# Patient Record
Sex: FEMALE | Race: WHITE
[De-identification: names, ages, dates, MRNs, and addresses within clinical notes are randomized per-mention and may not be internally consistent; named-entity substitution may affect disease eponyms.]

---

## 2019-08-09 ENCOUNTER — HOSPITAL ENCOUNTER (INPATIENT)
Dept: HOSPITAL 54 - ER | Age: 64
LOS: 4 days | Discharge: HOME | DRG: 871 | End: 2019-08-13
Attending: FAMILY MEDICINE | Admitting: INTERNAL MEDICINE
Payer: MEDICARE

## 2019-08-09 VITALS — SYSTOLIC BLOOD PRESSURE: 101 MMHG | DIASTOLIC BLOOD PRESSURE: 49 MMHG

## 2019-08-09 VITALS — DIASTOLIC BLOOD PRESSURE: 39 MMHG | SYSTOLIC BLOOD PRESSURE: 130 MMHG

## 2019-08-09 VITALS — DIASTOLIC BLOOD PRESSURE: 51 MMHG | SYSTOLIC BLOOD PRESSURE: 91 MMHG

## 2019-08-09 VITALS — BODY MASS INDEX: 25.71 KG/M2 | HEIGHT: 66 IN | WEIGHT: 160 LBS

## 2019-08-09 VITALS — DIASTOLIC BLOOD PRESSURE: 48 MMHG | SYSTOLIC BLOOD PRESSURE: 94 MMHG

## 2019-08-09 DIAGNOSIS — Z87.81: ICD-10-CM

## 2019-08-09 DIAGNOSIS — N17.0: ICD-10-CM

## 2019-08-09 DIAGNOSIS — Z79.899: ICD-10-CM

## 2019-08-09 DIAGNOSIS — J18.9: ICD-10-CM

## 2019-08-09 DIAGNOSIS — I21.4: ICD-10-CM

## 2019-08-09 DIAGNOSIS — Z87.01: ICD-10-CM

## 2019-08-09 DIAGNOSIS — J96.01: ICD-10-CM

## 2019-08-09 DIAGNOSIS — A41.9: Primary | ICD-10-CM

## 2019-08-09 DIAGNOSIS — M19.90: ICD-10-CM

## 2019-08-09 DIAGNOSIS — D63.8: ICD-10-CM

## 2019-08-09 DIAGNOSIS — I21.A1: ICD-10-CM

## 2019-08-09 DIAGNOSIS — I50.32: ICD-10-CM

## 2019-08-09 DIAGNOSIS — G93.41: ICD-10-CM

## 2019-08-09 DIAGNOSIS — N18.9: ICD-10-CM

## 2019-08-09 DIAGNOSIS — I12.9: ICD-10-CM

## 2019-08-09 LAB
ALBUMIN SERPL BCP-MCNC: 1.7 G/DL (ref 3.4–5)
ALP SERPL-CCNC: 123 U/L (ref 46–116)
ALT SERPL W P-5'-P-CCNC: 27 U/L (ref 12–78)
AST SERPL W P-5'-P-CCNC: 52 U/L (ref 15–37)
BASE EXCESS BLDA CALC-SCNC: 1.1 MMOL/L
BASOPHILS # BLD AUTO: 0.1 /CMM (ref 0–0.2)
BASOPHILS NFR BLD AUTO: 0.7 % (ref 0–2)
BILIRUB DIRECT SERPL-MCNC: 0.1 MG/DL (ref 0–0.2)
BILIRUB SERPL-MCNC: 0.1 MG/DL (ref 0.2–1)
BUN SERPL-MCNC: 33 MG/DL (ref 7–18)
CALCIUM SERPL-MCNC: 8.8 MG/DL (ref 8.5–10.1)
CHLORIDE SERPL-SCNC: 103 MMOL/L (ref 98–107)
CO2 SERPL-SCNC: 25 MMOL/L (ref 21–32)
CREAT SERPL-MCNC: 1.3 MG/DL (ref 0.6–1.3)
DO-HGB MFR BLDA: 401.8 MMHG
EOSINOPHIL NFR BLD AUTO: 0.1 % (ref 0–6)
GLUCOSE SERPL-MCNC: 119 MG/DL (ref 74–106)
HCT VFR BLD AUTO: 27 % (ref 33–45)
HGB BLD-MCNC: 9 G/DL (ref 11.5–14.8)
INHALED O2 CONCENTRATION: 100 %
INHALED O2 FLOW RATE: 15 L/MIN (ref 0–30)
LYMPHOCYTES NFR BLD AUTO: 1.4 /CMM (ref 0.8–4.8)
LYMPHOCYTES NFR BLD AUTO: 13.7 % (ref 20–44)
MCHC RBC AUTO-ENTMCNC: 33 G/DL (ref 31–36)
MCV RBC AUTO: 90 FL (ref 82–100)
MONOCYTES NFR BLD AUTO: 1.1 /CMM (ref 0.1–1.3)
MONOCYTES NFR BLD AUTO: 10.7 % (ref 2–12)
NEUTROPHILS # BLD AUTO: 7.8 /CMM (ref 1.8–8.9)
NEUTROPHILS NFR BLD AUTO: 74.8 % (ref 43–81)
PCO2 TEMP ADJ BLDA: 48.2 MMHG (ref 35–45)
PH TEMP ADJ BLDA: 7.36 [PH] (ref 7.35–7.45)
PH UR STRIP: 5 [PH] (ref 5–8)
PLATELET # BLD AUTO: 363 /CMM (ref 150–450)
PO2 TEMP ADJ BLDA: 263 MMHG (ref 75–100)
POTASSIUM SERPL-SCNC: 4.6 MMOL/L (ref 3.5–5.1)
PROT SERPL-MCNC: 6.9 G/DL (ref 6.4–8.2)
RBC # BLD AUTO: 3.02 MIL/UL (ref 4–5.2)
RBC #/AREA URNS HPF: (no result) /HPF (ref 0–2)
SAO2 % BLDA: 99 % (ref 92–98.5)
SODIUM SERPL-SCNC: 138 MMOL/L (ref 136–145)
UROBILINOGEN UR STRIP-MCNC: 0.2 EU/DL
WBC #/AREA URNS HPF: (no result) /HPF (ref 0–3)
WBC NRBC COR # BLD AUTO: 10.5 K/UL (ref 4.3–11)

## 2019-08-09 PROCEDURE — G0378 HOSPITAL OBSERVATION PER HR: HCPCS

## 2019-08-09 PROCEDURE — C9113 INJ PANTOPRAZOLE SODIUM, VIA: HCPCS

## 2019-08-09 RX ADMIN — SODIUM CHLORIDE PRN MLS/HR: 9 INJECTION, SOLUTION INTRAVENOUS at 20:57

## 2019-08-09 RX ADMIN — LEVOFLOXACIN SCH MG: 500 TABLET, FILM COATED ORAL at 21:21

## 2019-08-09 RX ADMIN — PIPERACILLIN SODIUM AND TAZOBACTAM SODIUM SCH MLS/HR: .375; 3 INJECTION, POWDER, LYOPHILIZED, FOR SOLUTION INTRAVENOUS at 23:00

## 2019-08-09 RX ADMIN — ENOXAPARIN SODIUM SCH MG: 40 INJECTION SUBCUTANEOUS at 20:57

## 2019-08-09 NOTE — NUR
ICU RN

RCD PT FROM ER DX PNA; A/Ox 4. NSR ON MONITOR.

O2 2L NC CLEAR BREATH SOUNDS. SKIN INTACT.

PT EDUCATED ON PLAN OF CARE. VERBALIZES UNDERSTANDING.

## 2019-08-09 NOTE — NUR
"SOB WITH HYPOXIA PER PARAMEDIC REPORT, RECENTLY DC FROM [facility]" PT TO BED 
5, PT AAOX4, ON 15L NRBR, O2 %, RT AND MD AT BEDSIDE

## 2019-08-10 VITALS — SYSTOLIC BLOOD PRESSURE: 157 MMHG | DIASTOLIC BLOOD PRESSURE: 80 MMHG

## 2019-08-10 VITALS — SYSTOLIC BLOOD PRESSURE: 145 MMHG | DIASTOLIC BLOOD PRESSURE: 76 MMHG

## 2019-08-10 VITALS — SYSTOLIC BLOOD PRESSURE: 147 MMHG | DIASTOLIC BLOOD PRESSURE: 67 MMHG

## 2019-08-10 VITALS — DIASTOLIC BLOOD PRESSURE: 62 MMHG | SYSTOLIC BLOOD PRESSURE: 118 MMHG

## 2019-08-10 VITALS — DIASTOLIC BLOOD PRESSURE: 82 MMHG | SYSTOLIC BLOOD PRESSURE: 153 MMHG

## 2019-08-10 VITALS — SYSTOLIC BLOOD PRESSURE: 148 MMHG | DIASTOLIC BLOOD PRESSURE: 76 MMHG

## 2019-08-10 VITALS — SYSTOLIC BLOOD PRESSURE: 149 MMHG | DIASTOLIC BLOOD PRESSURE: 77 MMHG

## 2019-08-10 VITALS — DIASTOLIC BLOOD PRESSURE: 63 MMHG | SYSTOLIC BLOOD PRESSURE: 117 MMHG

## 2019-08-10 VITALS — DIASTOLIC BLOOD PRESSURE: 77 MMHG | SYSTOLIC BLOOD PRESSURE: 128 MMHG

## 2019-08-10 VITALS — SYSTOLIC BLOOD PRESSURE: 149 MMHG | DIASTOLIC BLOOD PRESSURE: 79 MMHG

## 2019-08-10 VITALS — DIASTOLIC BLOOD PRESSURE: 60 MMHG | SYSTOLIC BLOOD PRESSURE: 112 MMHG

## 2019-08-10 VITALS — DIASTOLIC BLOOD PRESSURE: 70 MMHG | SYSTOLIC BLOOD PRESSURE: 132 MMHG

## 2019-08-10 VITALS — SYSTOLIC BLOOD PRESSURE: 144 MMHG | DIASTOLIC BLOOD PRESSURE: 71 MMHG

## 2019-08-10 VITALS — DIASTOLIC BLOOD PRESSURE: 73 MMHG | SYSTOLIC BLOOD PRESSURE: 120 MMHG

## 2019-08-10 VITALS — DIASTOLIC BLOOD PRESSURE: 66 MMHG | SYSTOLIC BLOOD PRESSURE: 160 MMHG

## 2019-08-10 VITALS — SYSTOLIC BLOOD PRESSURE: 156 MMHG | DIASTOLIC BLOOD PRESSURE: 88 MMHG

## 2019-08-10 VITALS — DIASTOLIC BLOOD PRESSURE: 68 MMHG | SYSTOLIC BLOOD PRESSURE: 148 MMHG

## 2019-08-10 LAB
ALBUMIN SERPL BCP-MCNC: 1.5 G/DL (ref 3.4–5)
ALP SERPL-CCNC: 102 U/L (ref 46–116)
ALT SERPL W P-5'-P-CCNC: 23 U/L (ref 12–78)
AST SERPL W P-5'-P-CCNC: 42 U/L (ref 15–37)
BASOPHILS # BLD AUTO: 0 /CMM (ref 0–0.2)
BASOPHILS NFR BLD AUTO: 0.3 % (ref 0–2)
BILIRUB SERPL-MCNC: 0.3 MG/DL (ref 0.2–1)
BUN SERPL-MCNC: 25 MG/DL (ref 7–18)
CALCIUM SERPL-MCNC: 8 MG/DL (ref 8.5–10.1)
CHLORIDE SERPL-SCNC: 106 MMOL/L (ref 98–107)
CHOLEST SERPL-MCNC: 78 MG/DL (ref ?–200)
CO2 SERPL-SCNC: 24 MMOL/L (ref 21–32)
CREAT SERPL-MCNC: 1.1 MG/DL (ref 0.6–1.3)
EOSINOPHIL NFR BLD AUTO: 1.2 % (ref 0–6)
GLUCOSE SERPL-MCNC: 92 MG/DL (ref 74–106)
HCT VFR BLD AUTO: 26 % (ref 33–45)
HDLC SERPL-MCNC: 20 MG/DL (ref 40–60)
HEMOCCULT STL QL: NEGATIVE
HGB BLD-MCNC: 8.5 G/DL (ref 11.5–14.8)
IRON SERPL-MCNC: 14 UG/DL (ref 50–175)
LDLC SERPL DIRECT ASSAY-MCNC: 36 MG/DL (ref 0–99)
LYMPHOCYTES NFR BLD AUTO: 0.7 /CMM (ref 0.8–4.8)
LYMPHOCYTES NFR BLD AUTO: 7.8 % (ref 20–44)
MAGNESIUM SERPL-MCNC: 1.6 MG/DL (ref 1.8–2.4)
MCHC RBC AUTO-ENTMCNC: 33 G/DL (ref 31–36)
MCV RBC AUTO: 91 FL (ref 82–100)
MONOCYTES NFR BLD AUTO: 0.9 /CMM (ref 0.1–1.3)
MONOCYTES NFR BLD AUTO: 11 % (ref 2–12)
NEUTROPHILS # BLD AUTO: 6.8 /CMM (ref 1.8–8.9)
NEUTROPHILS NFR BLD AUTO: 79.7 % (ref 43–81)
PHOSPHATE SERPL-MCNC: 3.7 MG/DL (ref 2.5–4.9)
PLATELET # BLD AUTO: 309 /CMM (ref 150–450)
POTASSIUM SERPL-SCNC: 4 MMOL/L (ref 3.5–5.1)
PROT SERPL-MCNC: 6.3 G/DL (ref 6.4–8.2)
RBC # BLD AUTO: 2.81 MIL/UL (ref 4–5.2)
SODIUM SERPL-SCNC: 140 MMOL/L (ref 136–145)
TIBC SERPL-MCNC: 115 UG/DL (ref 250–450)
TRIGL SERPL-MCNC: 146 MG/DL (ref 30–150)
WBC NRBC COR # BLD AUTO: 8.6 K/UL (ref 4.3–11)

## 2019-08-10 RX ADMIN — Medication PRN EACH: at 12:22

## 2019-08-10 RX ADMIN — ENOXAPARIN SODIUM SCH MG: 40 INJECTION SUBCUTANEOUS at 21:00

## 2019-08-10 RX ADMIN — PIPERACILLIN SODIUM AND TAZOBACTAM SODIUM SCH MLS/HR: .375; 3 INJECTION, POWDER, LYOPHILIZED, FOR SOLUTION INTRAVENOUS at 05:00

## 2019-08-10 RX ADMIN — ALPRAZOLAM PRN MG: 0.25 TABLET ORAL at 22:18

## 2019-08-10 RX ADMIN — PIPERACILLIN SODIUM AND TAZOBACTAM SODIUM SCH MLS/HR: .375; 3 INJECTION, POWDER, LYOPHILIZED, FOR SOLUTION INTRAVENOUS at 14:06

## 2019-08-10 RX ADMIN — Medication PRN EACH: at 16:37

## 2019-08-10 RX ADMIN — MAGNESIUM SULFATE IN DEXTROSE SCH MLS/HR: 10 INJECTION, SOLUTION INTRAVENOUS at 09:39

## 2019-08-10 RX ADMIN — Medication SCH EACH: at 09:40

## 2019-08-10 RX ADMIN — MAGNESIUM SULFATE IN DEXTROSE SCH MLS/HR: 10 INJECTION, SOLUTION INTRAVENOUS at 11:32

## 2019-08-10 RX ADMIN — SODIUM CHLORIDE PRN MLS/HR: 9 INJECTION, SOLUTION INTRAVENOUS at 12:24

## 2019-08-10 RX ADMIN — CEFEPIME HYDROCHLORIDE SCH MLS/HR: 1 INJECTION, POWDER, FOR SOLUTION INTRAMUSCULAR; INTRAVENOUS at 17:56

## 2019-08-10 RX ADMIN — Medication PRN EACH: at 20:58

## 2019-08-10 RX ADMIN — ASPIRIN 81 MG SCH MG: 81 TABLET ORAL at 08:46

## 2019-08-10 RX ADMIN — Medication SCH EACH: at 16:07

## 2019-08-10 RX ADMIN — LEVOFLOXACIN SCH MG: 500 TABLET, FILM COATED ORAL at 20:57

## 2019-08-10 RX ADMIN — ASPIRIN 81 MG SCH MG: 81 TABLET ORAL at 08:48

## 2019-08-10 NOTE — NUR
RN NOTES



INFORMED DR JACKSON ON PROCALCITONIN AT 4.29. NO NEW ORDER AND WITH REQUEST TO INFORM 
PULMONOLOGIST

## 2019-08-10 NOTE — NUR
TELE/RN 



PATIENT TRANSFERRED FROM ICU IN STABLE CONDITION, ON TELE AT THIS TIME TOLERATING WELL. 
RECEIVED REPORT FROM DARRICK RN FOR ANY EDVIN. A/O X 4. NO SIGNS OF ACUTE DISTRESS. NO 
COMPLAIN OF PAIN OR DISCOMFORT. ON OXYGEN VIA NC AT 2LPM, TOLERATING WELL. ALL NEEDS 
ATTENDED TO AT THIS TIME. WILL CONTINUE TO MONITOR TO ENSURE SAFETY.

## 2019-08-10 NOTE — NUR
RN NOTES



TRANSFERRED PATIENT TO ROOM 309-1. HAND OFF. ENDORSED TO WILFRIDO THAT ZOSYN NOT HANGED YET 
TO THIS TIME SINCE PATIENT STILL GETTING VANCOMYCIN AND MAGNESIUM.

## 2019-08-10 NOTE — NUR
TELE RN NOTES  PAIN MANAGEMENT

C/O GENERALIZED PAIN 7/10 ON PAIN SCALE,NORCO 5/325MG,1 TAB PO GIVEN AS ORDERED.

## 2019-08-10 NOTE — NUR
ICU RN

PT UPSET SHE IS AT THIS HOSPITAL. STATES SHE SHOULD HAVE BEEN TAKEN TO [facility].

PER PT SHE IS FRUSTRATED AND HAS REMOVED NIBP CUFF.

PT REQUESTED SOMETHING FOR HEAD PAIN.

OFFERED TYLENOL; PT AGREED. MEDICATED WITH TYLENOL 650 MG PO FOR HEAD ACHE 3/10.

CONTINUE TO MONITOR.

## 2019-08-10 NOTE — NUR
RN NOTES



RECEIVED PATIENT IN BED, A/A/O X4, ON SITTING POSITION.  ABLE TO MAKE NEEDS KNOWN. DENIES 
SHORTNESS OF BREATH, ON OXYGEN SUPPORT VIA NASAL CANNULA AT 2LPM, SATING FINE BUT WITH 
OBVIOUS EFFORT ON BREATHING SPECIALLY WHEN TALKING. PATIENT " WHY AM I HERE? I WANTED TO GO 
HOME."REORIENTED PATIENT ON THE SITUATION AND INFORMED HER THAT HER STAY IN THE HOSPITAL 
WILL BE UP TO THE MD AND WOULD BE BETTER IF SHE DIRECTS HER CONCERN WITH THE MD. WILL 
INFORMED MD ABOUT IT.  PATIENT DENIES ANY PAIN AT THIS TIME. SINUS RHYTHM ON THE MONITOR 
WITH HR ON THE 90'S. IV LINE ON THE R AC G 20 AND R HAND G 20: BOTH IN PLACE, DRESSING CDI, 
PATENT ON FLUSHING, WITH ONGOING IVF OF NS AT 75 CC/HR. PATIENT ENCOURAGE TO CALL FOR 
HELP/ASSISTANCE. SAFETY MEASURES OBSERVED AND MAINTAINED. CALL LIGHT PLACED WITHIN REACH. 
WILL CONTINUE TO MONITOR AND ANTICIPATE NEEDS OF THE PATIENT

## 2019-08-10 NOTE — NUR
RN NOTES



PATIENT REFUSED SCHEDULED  ASPIRIN " I DONT TAKE ASPIRIN" INFORMED PATIENT THAT IT WAS JUST 
ORDERED BY DR JACKSON. " I REALLY DONT THING ASPIRIN SPECIALLY NOW THEY HAVE GIVEN ME BLOOD 
THINNER." RISK AND BENEFITS EXPLAINED. PATIENT STILL REFUSED

## 2019-08-10 NOTE — NUR
TELE/RN CLOSING NOTE



PATIENT IN BED IN STABLE CONDITION. A/O X 4. NO SIGNS OF ACUTE DISTRESS. NO COMPLAIN OF PAIN 
OR DISCOMFORT. ON TELE MONITOR NOTED WITH SR WITH RATE OF 89. ALL NEEDS ATTENDED TO. CALL 
LIGHT WITHIN REACH. WILL ENDORSE TO NEXT SHIFT FOR CONTINUITY OF CARE.

## 2019-08-10 NOTE — NUR
TELE RN NOTES

RECEIVED ON BED A/O X4,TRANSFER FROM ICU TODAY,BREATHING NON LABORED,DIMINISHED BREATH SOUND 
ON BOTH LOWER LUNG FIELD DUE TO PNEUMONIA.O2 IN USED AT 2L/TO KEEP O2 SAT ABOVE 90%,97% AT 
THE MOMENT.SALINE LOCK LEFT HAND INTACT AND PATENT.NS AT 75ML/HR RATE IN PROGRESS.CLAIMED OF 
HAVING DIARRHEA, HAD ONE THIS TIME AND ITS SOFT AND GREENISH,MODERATE AMOUNT.APPEARS 
WEAK,ASSISTED TO BEDSIDE COMMODE,TOLERATED WELL.ON GEL BED FOR SKIN MANAGEMENT.CALL LIGHT IN 
REACH,NEEDS ANTICIPATED.

## 2019-08-10 NOTE — NUR
ICU RN

PT DECLINED TO BE REPOSITIONED BY NURSING STAFF; PER PT OFFLOADING ON PILLOW CAUSED HER BACK 
TO HURT.

PT REMAINED IN SUPINE POSITION AND DECLINED OFFLOADING HEELS/ELBOWS.

## 2019-08-10 NOTE — NUR
TELE RN NOTES

VERY ANXIOUS.DR KEN MADE AWARE,WITH ORDER TO GIVE XANAX .25MG PO Q8 HOURS AS NEEDED 
NOTED AND CARRIED OUT.

## 2019-08-10 NOTE — NUR
TELE RN NOTES

OFFERED LOVENOX AS SCHEDULED BUT REFUSED.EXPLAINED RISK AND BENEFITS,BUT STILL REFUSED.

## 2019-08-10 NOTE — NUR
RN NOTES



SEEN AND EXAMINED BY DR. JACKSON WITH ORDERS FOR LABS IN AM AND PATIENT TO TRANSFER  TO MED 
SURG.

## 2019-08-11 VITALS — SYSTOLIC BLOOD PRESSURE: 160 MMHG | DIASTOLIC BLOOD PRESSURE: 90 MMHG

## 2019-08-11 VITALS — DIASTOLIC BLOOD PRESSURE: 79 MMHG | SYSTOLIC BLOOD PRESSURE: 135 MMHG

## 2019-08-11 VITALS — DIASTOLIC BLOOD PRESSURE: 71 MMHG | SYSTOLIC BLOOD PRESSURE: 157 MMHG

## 2019-08-11 VITALS — DIASTOLIC BLOOD PRESSURE: 88 MMHG | SYSTOLIC BLOOD PRESSURE: 163 MMHG

## 2019-08-11 VITALS — DIASTOLIC BLOOD PRESSURE: 78 MMHG | SYSTOLIC BLOOD PRESSURE: 134 MMHG

## 2019-08-11 VITALS — SYSTOLIC BLOOD PRESSURE: 154 MMHG | DIASTOLIC BLOOD PRESSURE: 80 MMHG

## 2019-08-11 LAB
ALBUMIN SERPL BCP-MCNC: 1.5 G/DL (ref 3.4–5)
ALP SERPL-CCNC: 108 U/L (ref 46–116)
ALT SERPL W P-5'-P-CCNC: 21 U/L (ref 12–78)
AST SERPL W P-5'-P-CCNC: 30 U/L (ref 15–37)
BASOPHILS # BLD AUTO: 0 /CMM (ref 0–0.2)
BASOPHILS NFR BLD AUTO: 0.2 % (ref 0–2)
BILIRUB SERPL-MCNC: 0.3 MG/DL (ref 0.2–1)
BUN SERPL-MCNC: 8 MG/DL (ref 7–18)
CALCIUM SERPL-MCNC: 8.5 MG/DL (ref 8.5–10.1)
CHLORIDE SERPL-SCNC: 106 MMOL/L (ref 98–107)
CO2 SERPL-SCNC: 24 MMOL/L (ref 21–32)
CREAT SERPL-MCNC: 0.7 MG/DL (ref 0.6–1.3)
EOSINOPHIL NFR BLD AUTO: 1.8 % (ref 0–6)
GLUCOSE SERPL-MCNC: 88 MG/DL (ref 74–106)
HCT VFR BLD AUTO: 26 % (ref 33–45)
HGB BLD-MCNC: 8.7 G/DL (ref 11.5–14.8)
LYMPHOCYTES NFR BLD AUTO: 0.8 /CMM (ref 0.8–4.8)
LYMPHOCYTES NFR BLD AUTO: 10.5 % (ref 20–44)
MAGNESIUM SERPL-MCNC: 1.5 MG/DL (ref 1.8–2.4)
MCHC RBC AUTO-ENTMCNC: 33 G/DL (ref 31–36)
MCV RBC AUTO: 89 FL (ref 82–100)
MONOCYTES NFR BLD AUTO: 0.9 /CMM (ref 0.1–1.3)
MONOCYTES NFR BLD AUTO: 12.3 % (ref 2–12)
NEUTROPHILS # BLD AUTO: 5.8 /CMM (ref 1.8–8.9)
NEUTROPHILS NFR BLD AUTO: 75.2 % (ref 43–81)
PHOSPHATE SERPL-MCNC: 2.6 MG/DL (ref 2.5–4.9)
PLATELET # BLD AUTO: 359 /CMM (ref 150–450)
POTASSIUM SERPL-SCNC: 3.8 MMOL/L (ref 3.5–5.1)
PROT SERPL-MCNC: 6.5 G/DL (ref 6.4–8.2)
RBC # BLD AUTO: 2.95 MIL/UL (ref 4–5.2)
SODIUM SERPL-SCNC: 140 MMOL/L (ref 136–145)
WBC NRBC COR # BLD AUTO: 7.7 K/UL (ref 4.3–11)

## 2019-08-11 PROCEDURE — 05HC33Z INSERTION OF INFUSION DEVICE INTO LEFT BASILIC VEIN, PERCUTANEOUS APPROACH: ICD-10-PCS | Performed by: NURSE PRACTITIONER

## 2019-08-11 RX ADMIN — LEVOFLOXACIN SCH MG: 500 TABLET, FILM COATED ORAL at 20:53

## 2019-08-11 RX ADMIN — GUAIFENESIN AND DEXTROMETHORPHAN PRN ML: 100; 10 SYRUP ORAL at 21:48

## 2019-08-11 RX ADMIN — Medication PRN EACH: at 01:39

## 2019-08-11 RX ADMIN — GABAPENTIN SCH MG: 300 CAPSULE ORAL at 20:53

## 2019-08-11 RX ADMIN — Medication PRN EACH: at 05:25

## 2019-08-11 RX ADMIN — ALPRAZOLAM PRN MG: 0.25 TABLET ORAL at 06:50

## 2019-08-11 RX ADMIN — ALBUTEROL SULFATE PRN MG: 2.5 SOLUTION RESPIRATORY (INHALATION) at 23:37

## 2019-08-11 RX ADMIN — ATORVASTATIN CALCIUM SCH MG: 10 TABLET, FILM COATED ORAL at 21:48

## 2019-08-11 RX ADMIN — SODIUM CHLORIDE SCH MLS/HR: 9 INJECTION, SOLUTION INTRAVENOUS at 15:58

## 2019-08-11 RX ADMIN — Medication PRN MG: at 23:38

## 2019-08-11 RX ADMIN — ZOLPIDEM TARTRATE PRN MG: 5 TABLET, FILM COATED ORAL at 21:53

## 2019-08-11 RX ADMIN — CEFEPIME HYDROCHLORIDE SCH MLS/HR: 1 INJECTION, POWDER, FOR SOLUTION INTRAMUSCULAR; INTRAVENOUS at 17:53

## 2019-08-11 RX ADMIN — ENOXAPARIN SODIUM SCH MG: 40 INJECTION SUBCUTANEOUS at 20:59

## 2019-08-11 RX ADMIN — ALPRAZOLAM PRN MG: 0.25 TABLET ORAL at 15:34

## 2019-08-11 RX ADMIN — CEFEPIME HYDROCHLORIDE SCH MLS/HR: 1 INJECTION, POWDER, FOR SOLUTION INTRAMUSCULAR; INTRAVENOUS at 05:33

## 2019-08-11 RX ADMIN — Medication PRN EACH: at 15:34

## 2019-08-11 RX ADMIN — GABAPENTIN SCH MG: 300 CAPSULE ORAL at 17:52

## 2019-08-11 RX ADMIN — Medication SCH EACH: at 17:00

## 2019-08-11 RX ADMIN — Medication SCH EACH: at 09:00

## 2019-08-11 RX ADMIN — Medication PRN EACH: at 23:57

## 2019-08-11 RX ADMIN — ASPIRIN 81 MG SCH MG: 81 TABLET ORAL at 09:00

## 2019-08-11 RX ADMIN — SODIUM CHLORIDE PRN MLS/HR: 9 INJECTION, SOLUTION INTRAVENOUS at 05:15

## 2019-08-11 RX ADMIN — Medication PRN EACH: at 09:37

## 2019-08-11 RX ADMIN — CARISOPRODOL SCH MG: 350 TABLET ORAL at 17:52

## 2019-08-11 NOTE — NUR
MS RN NOTES

NOTED SKIN WARM TO THE TOUCH,BODY TEMPERATURE .3 VIA AXILLA.C/O GENERALIZED PAIN 6/10 
ON PAIN SCALE.MEDICATED WITH NORCO 5/325MG,1TAB PO AS ORDERED,ALONG WITH TESSALON ROSIO 
100MG PO FOR COUGHAS ORDERED.

## 2019-08-11 NOTE — NUR
TELE RN NOTES

SLEPT WITH INTERVALS.VERY NEEDY.STOOL OB NEGATIVE FOR OCCULT BLOOD.WITH EPISODE OF NON 
PRODUCTIVE COUGH,OFFERED COUGH MEDICINE TO BE ORDERED BY HOSPITALIST ON CALL BUT REFUSED.NO 
SOB,AFEBRILE.IN NO ACUTE DISTRESS.WILL ENDORSE TO IGOR GRADY FOR EDVIN.

## 2019-08-11 NOTE — NUR
MS RN NOTES

RECEIVED ON BED SLEEPING,AROUSABLE TO VERBAL STIMULI.BREATHING NON LABORED,OFF O2 THIS 
TIME.WITH LEFT UPPER ARM MIDLINE FOR IV ABX.AMBULATE WITH ASSIST AND TO BEDSIDE COMMODE.WILL 
CONTINUE TO MONITOR STATUS.

## 2019-08-11 NOTE — NUR
RN CLOSING NOTES



PATIENT IN STABLE CONDITION. ALL NEEDS ATTENDED AND PROVIDED. ALL DUE MEDICATIONS GIVEN AS 
ORDERED. ASSISTED WITH ADLS. KEPT PATIENT SAFE AND COMFORTABLE. BED IN LOW/LOCKED POSITION, 
SIDERAILS UPX2, CALL LIGHT IN REACH. ENDORSED TO YSABEL LINO FOR EDVIN

## 2019-08-11 NOTE — NUR
RN NOTES



CALLED AND SPOKE WITH DR SAMUEL REGARDING RECONCILING HER HOME MEDS. PER MD, CONTINUE ALL 
HOME MEDS. VERIFIED READ BACK NOTED AND WILL CARRY OUT

## 2019-08-11 NOTE — NUR
RN NOTES



IV ACCESS NOT PATENT, LEAKING, DC IV ACCESS.

2 ATTEMPTS MADE FOR IV INSERTION. PATIENT REFUSED AFTER 2 ATTEMPTS, PATIENT SAID "I DONT 
WANT IT ANYMORE!".

DR SAMUEL MADE AWARE AND ORDERED TO PUT A MIDLINE.

## 2019-08-11 NOTE — NUR
TELE RN NOTES  PAIN MANAGEMENT

C/O GENERALIZED PAIN 6/10 ON PAIN SCALE.MEDICATED WITH NORCO 5/325MG,1 TAB PO AS ORDERED.

## 2019-08-11 NOTE — NUR
RN NOTES

OFF ON OXYGEN.

PATIENT TOLERATING ROOM AIR SATTING 96%.

SEEN BY PT. AGGRESSIVE AMBULATION TODAY.

## 2019-08-11 NOTE — NUR
MS RN NOTES

STILL HAVING EPISODE OF NON PRODUCTIVE COUGH.O2 SAT 94% ON 2L/NC,WITH HEART RATE  
MANUALLY FOR ONE MINUTE.,RR 24.DR KEN MADE AWARE WITH NEW ORDER FOR BREATHING TREATMENT 
Q 4 HOURS AS NEEDED FOR SOB/CONGESTION,TESSALON ROSIO 100MG Q 8 OURS FOR COUGH AND CHEST 
X-RAY IN THE MORNING NOTED AND CARRIED OUT.

## 2019-08-11 NOTE — NUR
RN OPENING NOTES



RECEIVED PATIENT IN BED RESTING. A/OX3, ABLE TO MAKE NEEDS KNOWN. NOT IN ANY FORM OF 
DISTRESS. NO SOB. DENIED PAIN OR DISCOMFORT AT THIS TIME. IV ACCESS INTACT AND PATENT. KEPT 
PATIENT SAFE AND COMFORTABLE. BED IN LOW/LOCKED POSITION, SIDERAILS UPX2, CALL LIGHT IN 
REACH.WILL CONTINUE TO MONITOR ACCORDINGLY.

## 2019-08-12 VITALS — DIASTOLIC BLOOD PRESSURE: 78 MMHG | SYSTOLIC BLOOD PRESSURE: 145 MMHG

## 2019-08-12 VITALS — SYSTOLIC BLOOD PRESSURE: 150 MMHG | DIASTOLIC BLOOD PRESSURE: 78 MMHG

## 2019-08-12 VITALS — DIASTOLIC BLOOD PRESSURE: 61 MMHG | SYSTOLIC BLOOD PRESSURE: 118 MMHG

## 2019-08-12 VITALS — SYSTOLIC BLOOD PRESSURE: 137 MMHG

## 2019-08-12 VITALS — DIASTOLIC BLOOD PRESSURE: 73 MMHG | SYSTOLIC BLOOD PRESSURE: 137 MMHG

## 2019-08-12 LAB
BASOPHILS # BLD AUTO: 0 /CMM (ref 0–0.2)
BASOPHILS NFR BLD AUTO: 0.5 % (ref 0–2)
BUN SERPL-MCNC: 5 MG/DL (ref 7–18)
CALCIUM SERPL-MCNC: 8.3 MG/DL (ref 8.5–10.1)
CHLORIDE SERPL-SCNC: 106 MMOL/L (ref 98–107)
CO2 SERPL-SCNC: 27 MMOL/L (ref 21–32)
CREAT SERPL-MCNC: 0.7 MG/DL (ref 0.6–1.3)
EOSINOPHIL NFR BLD AUTO: 0.3 % (ref 0–6)
GLUCOSE SERPL-MCNC: 98 MG/DL (ref 74–106)
HCT VFR BLD AUTO: 26 % (ref 33–45)
HEMOCCULT STL QL: NEGATIVE
HGB BLD-MCNC: 8.4 G/DL (ref 11.5–14.8)
LYMPHOCYTES NFR BLD AUTO: 1 /CMM (ref 0.8–4.8)
LYMPHOCYTES NFR BLD AUTO: 11.6 % (ref 20–44)
MAGNESIUM SERPL-MCNC: 1.4 MG/DL (ref 1.8–2.4)
MCHC RBC AUTO-ENTMCNC: 33 G/DL (ref 31–36)
MCV RBC AUTO: 90 FL (ref 82–100)
MONOCYTES NFR BLD AUTO: 0.9 /CMM (ref 0.1–1.3)
MONOCYTES NFR BLD AUTO: 10.6 % (ref 2–12)
NEUTROPHILS # BLD AUTO: 6.8 /CMM (ref 1.8–8.9)
NEUTROPHILS NFR BLD AUTO: 77 % (ref 43–81)
PLATELET # BLD AUTO: 367 /CMM (ref 150–450)
POTASSIUM SERPL-SCNC: 3.8 MMOL/L (ref 3.5–5.1)
RBC # BLD AUTO: 2.86 MIL/UL (ref 4–5.2)
SODIUM SERPL-SCNC: 140 MMOL/L (ref 136–145)
WBC NRBC COR # BLD AUTO: 8.8 K/UL (ref 4.3–11)

## 2019-08-12 RX ADMIN — CARISOPRODOL SCH MG: 350 TABLET ORAL at 16:22

## 2019-08-12 RX ADMIN — MAGNESIUM SULFATE IN DEXTROSE SCH MLS/HR: 10 INJECTION, SOLUTION INTRAVENOUS at 12:01

## 2019-08-12 RX ADMIN — MAGNESIUM SULFATE IN DEXTROSE SCH MLS/HR: 10 INJECTION, SOLUTION INTRAVENOUS at 10:56

## 2019-08-12 RX ADMIN — ZOLPIDEM TARTRATE PRN MG: 5 TABLET, FILM COATED ORAL at 22:16

## 2019-08-12 RX ADMIN — MAGNESIUM SULFATE IN DEXTROSE SCH MLS/HR: 10 INJECTION, SOLUTION INTRAVENOUS at 14:28

## 2019-08-12 RX ADMIN — GABAPENTIN SCH MG: 300 CAPSULE ORAL at 16:22

## 2019-08-12 RX ADMIN — SODIUM CHLORIDE SCH MLS/HR: 9 INJECTION, SOLUTION INTRAVENOUS at 15:41

## 2019-08-12 RX ADMIN — GABAPENTIN SCH MG: 300 CAPSULE ORAL at 09:33

## 2019-08-12 RX ADMIN — Medication SCH EACH: at 17:00

## 2019-08-12 RX ADMIN — Medication PRN MG: at 21:42

## 2019-08-12 RX ADMIN — MAGNESIUM SULFATE IN DEXTROSE SCH MLS/HR: 10 INJECTION, SOLUTION INTRAVENOUS at 13:17

## 2019-08-12 RX ADMIN — Medication SCH EACH: at 09:00

## 2019-08-12 RX ADMIN — CARISOPRODOL SCH MG: 350 TABLET ORAL at 09:33

## 2019-08-12 RX ADMIN — GABAPENTIN SCH MG: 300 CAPSULE ORAL at 12:39

## 2019-08-12 RX ADMIN — LEVOFLOXACIN SCH MG: 500 TABLET, FILM COATED ORAL at 21:13

## 2019-08-12 RX ADMIN — Medication PRN EACH: at 21:29

## 2019-08-12 RX ADMIN — ATORVASTATIN CALCIUM SCH MG: 10 TABLET, FILM COATED ORAL at 21:12

## 2019-08-12 RX ADMIN — GUAIFENESIN AND DEXTROMETHORPHAN PRN ML: 100; 10 SYRUP ORAL at 21:28

## 2019-08-12 RX ADMIN — Medication PRN EACH: at 15:54

## 2019-08-12 RX ADMIN — CARISOPRODOL SCH MG: 350 TABLET ORAL at 12:38

## 2019-08-12 RX ADMIN — ALBUTEROL SULFATE PRN MG: 2.5 SOLUTION RESPIRATORY (INHALATION) at 21:42

## 2019-08-12 RX ADMIN — ENOXAPARIN SODIUM SCH MG: 40 INJECTION SUBCUTANEOUS at 21:15

## 2019-08-12 RX ADMIN — GABAPENTIN SCH MG: 300 CAPSULE ORAL at 21:13

## 2019-08-12 RX ADMIN — CEFEPIME HYDROCHLORIDE SCH MLS/HR: 1 INJECTION, POWDER, FOR SOLUTION INTRAMUSCULAR; INTRAVENOUS at 05:29

## 2019-08-12 RX ADMIN — Medication PRN EACH: at 09:35

## 2019-08-12 RX ADMIN — CEFEPIME HYDROCHLORIDE SCH MLS/HR: 1 INJECTION, POWDER, FOR SOLUTION INTRAMUSCULAR; INTRAVENOUS at 17:25

## 2019-08-12 RX ADMIN — ASPIRIN 81 MG SCH MG: 81 TABLET ORAL at 09:00

## 2019-08-12 NOTE — NUR
MS RN NOTES

AWAKE THIS TIME,BODY TEMPERATURE RE CHECK DOWN TO 99.1 ORALLY.PATIENT CLAIMED SHE FEELS 
BETTER.ASSISTED TO THE TOILET.

## 2019-08-12 NOTE — NUR
RN CLOSING NOTES



PATIENT IN STABLE CONDITION. ALL NEEDS ATTENDED AND PROVIDED. ALL DUE MEDICATIONS GIVEN AS 
ORDERED. ASSISTED WITH ADLS. KEPT PATIENT SAFE AND COMFORTABLE. BED IN LOW/LOCKED POSITION, 
SIDERAILS UPX2, CALL LIGHT IN REACH. ENDORSED TO YSABEL NO FOR EDVIN

## 2019-08-12 NOTE — NUR
RN OPENING NOTES



RECEIVED PATIENT SITTING ON A CHAIR. A/OX3, ABLE TO MAKE NEEDS KNOWN. NOT IN ANY FORM OF 
DISTRESS. NO SOB. DENIED PAIN OR DISCOMFORT AT THIS TIME. IV ACCESS INTACT AND PATENT. KEPT 
PATIENT SAFE AND COMFORTABLE. BED IN LOW/LOCKED POSITION, SIDERAILS UPX2, CALL LIGHT IN 
REACH. WILL CONTINUE TO MONITOR ACCORDINGLY.

## 2019-08-12 NOTE — NUR
MS RN NOTES

VERBALIZED,SHE FEELS MUCH,MUCH BETTER AND SHE'S READY TO GO HOME.AFEBRILE.IN NO ACUTE 
DISTRESS.ENDORSED TO IGOR GRADY FOR EDVIN.

## 2019-08-12 NOTE — NUR
RN OPENING NOTES



RECEIVED PT IN BED AWAKE AND ABLE TO MAKE NEEDS KNOWN.  PT A/OX3.  RESPIRATIONS EVEN AND 
UNLABORED WITH NO S/S OF ACUTE DISTRESS OR SOB NOTED.  NO COMPLAINTS OF PAIN OR DISCOMFORT 
AT THIS TIME.  PT WITH VENKATESH MIDLINE IV ACCESS PATENT AND INTACT AND SL.  SAFETY MEASURES IN 
PLACE WITH BED IN LOWEST LOCKED POSITION WITH SIDE RAILS UPX2.  CALL LIGHT WITHIN REACH. 
WILL CONTINUE TO MONITOR.

## 2019-08-13 VITALS — SYSTOLIC BLOOD PRESSURE: 151 MMHG | DIASTOLIC BLOOD PRESSURE: 80 MMHG

## 2019-08-13 LAB
BUN SERPL-MCNC: 5 MG/DL (ref 7–18)
CALCIUM SERPL-MCNC: 8.4 MG/DL (ref 8.5–10.1)
CHLORIDE SERPL-SCNC: 106 MMOL/L (ref 98–107)
CO2 SERPL-SCNC: 28 MMOL/L (ref 21–32)
CREAT SERPL-MCNC: 0.7 MG/DL (ref 0.6–1.3)
GLUCOSE SERPL-MCNC: 92 MG/DL (ref 74–106)
POTASSIUM SERPL-SCNC: 3.7 MMOL/L (ref 3.5–5.1)
SODIUM SERPL-SCNC: 140 MMOL/L (ref 136–145)

## 2019-08-13 RX ADMIN — CEFEPIME HYDROCHLORIDE SCH MLS/HR: 1 INJECTION, POWDER, FOR SOLUTION INTRAMUSCULAR; INTRAVENOUS at 06:04

## 2019-08-13 RX ADMIN — ASPIRIN 81 MG SCH MG: 81 TABLET ORAL at 08:09

## 2019-08-13 RX ADMIN — Medication PRN EACH: at 08:07

## 2019-08-13 RX ADMIN — GABAPENTIN SCH MG: 300 CAPSULE ORAL at 08:04

## 2019-08-13 RX ADMIN — CARISOPRODOL SCH MG: 350 TABLET ORAL at 08:04

## 2019-08-13 RX ADMIN — GUAIFENESIN AND DEXTROMETHORPHAN PRN ML: 100; 10 SYRUP ORAL at 08:04

## 2019-08-13 RX ADMIN — Medication SCH EACH: at 08:04

## 2019-08-13 NOTE — NUR
RN OPENING NOTES



PT IN BED AWAKE AND ABLE TO MAKE NEEDS KNOWN.  PT A/OX3.  RESPIRATIONS EVEN AND UNLABORED 
WITH NO S/S OF ACUTE DISTRESS OR SOB NOTED.  NO COMPLAINTS OF PAIN OR DISCOMFORT AT THIS 
TIME.  PT WITH VENKATESH MIDLINE IV ACCESS PATENT AND INTACT AND SL.  SAFETY MEASURES IN PLACE 
WITH BED IN LOWEST LOCKED POSITION WITH SIDE RAILS UPX2.  CALL LIGHT WITHIN REACH. WILL 
ENDORSE TO ONCOMING NURSE FOR EDVIN.

## 2019-08-13 NOTE — NUR
m/s lvn: discharged



discharged home in stable condition accompanied by sister via private car with all d'c 
papers and valuables.

## 2019-08-13 NOTE — NUR
m/s lvn: md visit



seen and examined by dr. house with order to d'c home with prescriptions. pt has oxygen 
at home as stated.

## 2019-08-13 NOTE — NUR
m/s lvn: notes



midline removed, ebenezer. well with no bleeding, no redness, and no swelling noted. pressure 
dressing applied.

## 2019-08-13 NOTE — NUR
m/s lvn: notes



discharged instructions with prescriptions given to pt and verbalized understanding. 
reviewed all medications and including prescriptions; pt verbalized understanding. no 
bleeding on the iv site, dressing in place. all valuables returned to pt.

## 2019-08-13 NOTE — NUR
m/s lvn: initial assessment



received pt in bed awake, a/ox4; ambulatory. pt still coughing and c/o pain when coughing. 
pt request for pain relief. will continue to monitor.